# Patient Record
Sex: FEMALE | Employment: PART TIME | URBAN - METROPOLITAN AREA
[De-identification: names, ages, dates, MRNs, and addresses within clinical notes are randomized per-mention and may not be internally consistent; named-entity substitution may affect disease eponyms.]

---

## 2018-11-28 ENCOUNTER — OFFICE VISIT (OUTPATIENT)
Dept: FAMILY MEDICINE CLINIC | Age: 58
End: 2018-11-28

## 2018-11-28 VITALS
RESPIRATION RATE: 16 BRPM | SYSTOLIC BLOOD PRESSURE: 93 MMHG | WEIGHT: 167 LBS | TEMPERATURE: 98.4 F | HEIGHT: 64 IN | HEART RATE: 71 BPM | OXYGEN SATURATION: 98 % | BODY MASS INDEX: 28.51 KG/M2 | DIASTOLIC BLOOD PRESSURE: 60 MMHG

## 2018-11-28 DIAGNOSIS — E78.5 HYPERLIPIDEMIA, UNSPECIFIED HYPERLIPIDEMIA TYPE: Primary | ICD-10-CM

## 2018-11-28 DIAGNOSIS — E88.81 INSULIN RESISTANCE: ICD-10-CM

## 2018-11-28 DIAGNOSIS — G47.33 OBSTRUCTIVE SLEEP APNEA: ICD-10-CM

## 2018-11-28 RX ORDER — WITCH HAZEL 50 %
PADS, MEDICATED (EA) TOPICAL
COMMUNITY

## 2018-11-28 RX ORDER — CHOLECALCIFEROL (VITAMIN D3) 125 MCG
CAPSULE ORAL
COMMUNITY

## 2018-11-28 RX ORDER — MONTELUKAST SODIUM 10 MG/1
10 TABLET ORAL DAILY
COMMUNITY
End: 2019-12-19 | Stop reason: SDUPTHER

## 2018-11-28 RX ORDER — BISMUTH SUBSALICYLATE 262 MG
1 TABLET,CHEWABLE ORAL DAILY
COMMUNITY

## 2018-11-28 NOTE — PROGRESS NOTES
Carine Ward is a 62 y.o. female (: 1960) presenting to address: Chief Complaint Patient presents with [facility]  Cholesterol Problem Vitals:  
 18 1111 BP: 93/60 Pulse: 71 Resp: 16 Temp: 98.4 °F (36.9 °C) TempSrc: Oral  
SpO2: 98% Weight: 167 lb (75.8 kg) Height: 5' 4\" (1.626 m) PainSc:   0 - No pain Hearing/Vision: No exam data present Learning Assessment:  
No flowsheet data found. Depression Screening: PHQ over the last two weeks 2018 Little interest or pleasure in doing things Not at all Feeling down, depressed, irritable, or hopeless Not at all Total Score PHQ 2 0 Fall Risk Assessment:  
 
Fall Risk Assessment, last 12 mths 2018 Able to walk? Yes Fall in past 12 months? No  
 
Abuse Screening:  
 
Abuse Screening Questionnaire 2018 Do you ever feel afraid of your partner? Ronnie Muse Are you in a relationship with someone who physically or mentally threatens you? Ronnie Muse Is it safe for you to go home? Daiana Quiroga Coordination of Care Questionaire: 1. Have you been to the ER, urgent care clinic since your last visit? Hospitalized since your last visit? NO 
 
2. Have you seen or consulted any other health care providers outside of the [facility] since your last visit? Include any pap smears or colon screening.  NO

## 2018-11-28 NOTE — PROGRESS NOTES
HISTORY OF PRESENT ILLNESS Pamela Pool is a 62 y.o. female here to establish care. Patient has history of hyperlipidemia and insulin resistance as well as obstructive sleep apnea. She also has family history significant for father who  in his 46s secondary to myocardial infarction. Carmelo Whipple Establish Care The history is provided by the patient and medical records. Pertinent negatives include no chest pain, no abdominal pain, no headaches and no shortness of breath. Cholesterol Problem The history is provided by the patient and medical records. This is a chronic problem. The problem has not changed since onset. Pertinent negatives include no chest pain, no abdominal pain, no headaches and no shortness of breath. The symptoms are aggravated by eating. Relieved by: Lifestyle modification. Blood sugar problem The history is provided by the patient and medical records (Patient has history of insulin resistance. Last A1c was 5.9). The problem has not changed since onset. Pertinent negatives include no chest pain, no abdominal pain, no headaches and no shortness of breath. The symptoms are aggravated by eating. Relieved by: Lifestyle modification. Not on File Current Outpatient Medications Medication Sig  
 montelukast (SINGULAIR) 10 mg tablet Take 10 mg by mouth daily.  OTHER 1 Packet.  multivitamin (ONE A DAY) tablet Take 1 Tab by mouth daily.  cholecalciferol, vitamin D3, (VITAMIN D3) 2,000 unit tab Take  by mouth.  cyanocobalamin (VITAMIN B-12) 2,000 mcg TbER Take  by mouth. No current facility-administered medications for this visit. Carmelo Whipple Past Medical History:  
Diagnosis Date  Diverticulitis   
 sigmoid and descending colon with two distant episodes diverticulitis  Fatty liver  Hypercholesterolemia  Myopia of both eyes with astigmatism  Nephrolithiasis  Obesity  Osteoarthritis   
 bilateral knees  Rosacea  Seasonal allergic reaction  Sleep apnea  Vitamin D deficiency History reviewed. No pertinent surgical history. . 
Family History Problem Relation Age of Onset  Hypertension Mother [facility] Cataract Mother  Cataract Father  Elevated Lipids Father  Cancer Father Social History Socioeconomic History  Marital status: UNKNOWN Spouse name: Not on file  Number of children: Not on file  Years of education: Not on file  Highest education level: Not on file Social Needs  Financial resource strain: Not on file  Food insecurity - worry: Not on file  Food insecurity - inability: Not on file  Transportation needs - medical: Not on file  Transportation needs - non-medical: Not on file Occupational History  Not on file Tobacco Use  Smoking status: Never Smoker  Smokeless tobacco: Never Used Substance and Sexual Activity  Alcohol use: No  
  Frequency: Never  Drug use: No  
 Sexual activity: Not on file Other Topics Concern  Not on file Social History Narrative  Not on file Review of Systems Constitutional: Positive for diaphoresis. Negative for chills, fever, malaise/fatigue and weight loss. Diaphoresis secondary to menopause HENT: Negative. Negative for congestion, ear discharge, ear pain, hearing loss, nosebleeds, sore throat and tinnitus. Eyes: Negative. Negative for blurred vision, double vision, photophobia, pain, discharge and redness. Respiratory: Positive for cough. Negative for hemoptysis, sputum production, shortness of breath and wheezing. With seasonal allergies Cardiovascular: Negative. Negative for chest pain, palpitations, orthopnea, claudication, leg swelling and PND. Gastrointestinal: Negative. Negative for abdominal pain, blood in stool, constipation, diarrhea, heartburn, melena, nausea and vomiting. Genitourinary: Negative. Negative for dysuria, flank pain, frequency, hematuria and urgency. Musculoskeletal: Negative. Negative for back pain, joint pain, myalgias and neck pain. Skin: Negative. Negative for itching and rash. Neurological: Negative. Negative for dizziness, tingling, tremors, sensory change, speech change, focal weakness, seizures, loss of consciousness, weakness and headaches. Endo/Heme/Allergies: Positive for environmental allergies. Negative for polydipsia. Does not bruise/bleed easily. Psychiatric/Behavioral: Negative. Negative for depression, hallucinations, memory loss, substance abuse and suicidal ideas. The patient is not nervous/anxious and does not have insomnia. Visit Vitals BP 93/60 (BP 1 Location: Right arm, BP Patient Position: Sitting) Pulse 71 Temp 98.4 °F (36.9 °C) (Oral) Resp 16 Ht 5' 4\" (1.626 m) Wt 167 lb (75.8 kg) SpO2 98% BMI 28.67 kg/m² Physical Exam  
Constitutional: She is oriented to person, place, and time. She appears well-developed and well-nourished. HENT:  
Head: Normocephalic and atraumatic. Cardiovascular: Normal rate, regular rhythm, normal heart sounds and intact distal pulses. Exam reveals no gallop and no friction rub. No murmur heard. Pulmonary/Chest: Effort normal and breath sounds normal. No respiratory distress. She has no wheezes. She has no rales. Musculoskeletal: Normal range of motion. She exhibits no edema, tenderness or deformity. Neurological: She is alert and oriented to person, place, and time. No cranial nerve deficit. Coordination normal.  
Skin: Skin is warm and dry. No rash noted. No erythema. No pallor. Psychiatric: She has a normal mood and affect. Her behavior is normal. Judgment and thought content normal.  
Nursing note and vitals reviewed. ASSESSMENT and PLAN 
  ICD-10-CM ICD-9-CM 1. Hyperlipidemia, unspecified hyperlipidemia type E78.5 272.4 2. Insulin resistance E88.81 277.7 3. Obstructive sleep apnea G47.33 327.23 Follow-up Disposition: Return in about 2 months (around 1/28/2019). Patient will go to cardiology office to give blood for [facility] lab so that we can check inflammatory cytokines along with a lipid profile. She has been advised that if we are to start a statin we need to prophylax her with 200 mg of co-Q10 daily for 2 weeks. She also will return to the office after the holidays for 2-hour glucose tolerance test with insulin levels.

## 2019-03-10 ENCOUNTER — TELEPHONE (OUTPATIENT)
Dept: INTERNAL MEDICINE CLINIC | Age: 59
End: 2019-03-10

## 2019-03-10 DIAGNOSIS — N39.0 URINARY TRACT INFECTION WITHOUT HEMATURIA, SITE UNSPECIFIED: Primary | ICD-10-CM

## 2019-03-10 RX ORDER — SULFAMETHOXAZOLE AND TRIMETHOPRIM 800; 160 MG/1; MG/1
1 TABLET ORAL 2 TIMES DAILY
Qty: 14 TAB | Refills: 0 | Status: SHIPPED | OUTPATIENT
Start: 2019-03-10 | End: 2019-03-17

## 2019-03-10 RX ORDER — PHENAZOPYRIDINE HYDROCHLORIDE 100 MG/1
100 TABLET, FILM COATED ORAL
Qty: 9 TAB | Refills: 0 | Status: SHIPPED | OUTPATIENT
Start: 2019-03-10 | End: 2019-03-13

## 2019-03-10 NOTE — TELEPHONE ENCOUNTER
Received call from Dr. Gary Villafuerte who is on call and rounding this weekend. She has onset today of burning when voiding, urgency and frequency. No fever.   Will send bactrim and pyridium to her pharmacy

## 2019-03-26 NOTE — TELEPHONE ENCOUNTER
Received a fax from patient's pharmacy for simvastatin, but patient is allergic to statin drugs. How would you like to proceed?

## 2019-03-27 RX ORDER — SIMVASTATIN 20 MG/1
20 TABLET, FILM COATED ORAL
Qty: 90 TAB | Refills: 1 | Status: SHIPPED | OUTPATIENT
Start: 2019-03-27 | End: 2019-11-18

## 2019-11-18 ENCOUNTER — OFFICE VISIT (OUTPATIENT)
Dept: FAMILY MEDICINE CLINIC | Age: 59
End: 2019-11-18

## 2019-11-18 VITALS
BODY MASS INDEX: 36.81 KG/M2 | HEART RATE: 91 BPM | OXYGEN SATURATION: 99 % | RESPIRATION RATE: 16 BRPM | HEIGHT: 64 IN | SYSTOLIC BLOOD PRESSURE: 132 MMHG | DIASTOLIC BLOOD PRESSURE: 76 MMHG | TEMPERATURE: 97.4 F | WEIGHT: 215.6 LBS

## 2019-11-18 DIAGNOSIS — R05.9 COUGH: ICD-10-CM

## 2019-11-18 DIAGNOSIS — R60.9 EDEMA, UNSPECIFIED TYPE: ICD-10-CM

## 2019-11-18 DIAGNOSIS — R06.00 DYSPNEA, UNSPECIFIED TYPE: Primary | ICD-10-CM

## 2019-11-18 RX ORDER — BUDESONIDE AND FORMOTEROL FUMARATE DIHYDRATE 160; 4.5 UG/1; UG/1
2 AEROSOL RESPIRATORY (INHALATION) 2 TIMES DAILY
Qty: 1 INHALER | Refills: 5 | Status: SHIPPED | OUTPATIENT
Start: 2019-11-18

## 2019-11-18 NOTE — PROGRESS NOTES
Melissa Segal is a 61 y.o. female (: 1960) presenting to address:    Chief Complaint   Patient presents with    Cough       Vitals:    19 1718   BP: 132/76   Pulse: 91   Resp: 16   Temp: 97.4 °F (36.3 °C)   TempSrc: Temporal   SpO2: 99%   Weight: 215 lb 9.6 oz (97.8 kg)   Height: 5' 4\" (1.626 m)   PainSc:   0 - No pain       Hearing/Vision:   No exam data present    Learning Assessment:   No flowsheet data found. Depression Screening:     3 most recent PHQ Screens 2018   Little interest or pleasure in doing things Not at all   Feeling down, depressed, irritable, or hopeless Not at all   Total Score PHQ 2 0     Fall Risk Assessment:     Fall Risk Assessment, last 12 mths 2018   Able to walk? Yes   Fall in past 12 months? No     Abuse Screening:     Abuse Screening Questionnaire 2018   Do you ever feel afraid of your partner? N   Are you in a relationship with someone who physically or mentally threatens you? N   Is it safe for you to go home? Y     Coordination of Care Questionaire:   1. Have you been to the ER, urgent care clinic since your last visit? Hospitalized since your last visit? NO    2. Have you seen or consulted any other health care providers outside of the [address] [place] [address] since your last visit? Include any pap smears or colon screening.  NO

## 2019-11-18 NOTE — PROGRESS NOTES
HISTORY OF PRESENT ILLNESS  Maggi Askew is a 61 y.o. female for further evaluation of dyspnea cough and peripheral edema. Patient states for the past 6 months she is noticed increasing cough and dyspnea on exertion. She also has noticed increase and lower extremity edema. She denies orthopnea. .  Cough   The history is provided by the patient. This is a new problem. The problem has not changed since onset. Pertinent negatives include no chest pain, no abdominal pain, no headaches and no shortness of breath. Nothing aggravates the symptoms. Nothing relieves the symptoms. She has tried nothing for the symptoms. Breathing Problem   The history is provided by the patient. This is a new problem. The problem has not changed since onset. Associated symptoms include cough and leg swelling. Pertinent negatives include no headaches, no sputum production, no wheezing, no chest pain and no abdominal pain. It is unknown what precipitated the problem. She has tried nothing for the symptoms. She has had no prior hospitalizations. She has had no prior ED visits. She has had no prior ICU admissions. Associated medical issues do not include asthma, pneumonia or chronic lung disease. Leg Swelling   The history is provided by the patient. This is a new problem. The problem has not changed since onset. Pertinent negatives include no chest pain, no abdominal pain, no headaches and no shortness of breath. Nothing aggravates the symptoms. Nothing relieves the symptoms. She has tried nothing for the symptoms. Allergies   Allergen Reactions    Latex, Natural Rubber Unknown (comments)     Personal use only per patient    Crestor [Rosuvastatin] Myalgia     Current Outpatient Medications on File Prior to Visit   Medication Sig Dispense Refill    montelukast (SINGULAIR) 10 mg tablet Take 10 mg by mouth daily.  OTHER 1 Packet.  multivitamin (ONE A DAY) tablet Take 1 Tab by mouth daily.       cholecalciferol, vitamin D3, (VITAMIN D3) 2,000 unit tab Take  by mouth.  cyanocobalamin (VITAMIN B-12) 2,000 mcg TbER Take  by mouth.  [DISCONTINUED] simvastatin (ZOCOR) 20 mg tablet Take 1 Tab by mouth nightly. 90 Tab 1     No current facility-administered medications on file prior to visit. Past Medical History:   Diagnosis Date    Diverticulitis     sigmoid and descending colon with two distant episodes diverticulitis    Fatty liver     Hypercholesterolemia     Myopia of both eyes with astigmatism     Nephrolithiasis     Obesity     Osteoarthritis     bilateral knees    Rosacea     Seasonal allergic reaction     Sleep apnea     Vitamin D deficiency      No past surgical history on file.   Family History   Problem Relation Age of Onset    Hypertension Mother    [facility] Cataract Mother     Cataract Father    [facility] Elevated Lipids Father     Cancer Father      Social History     Socioeconomic History    Marital status: UNKNOWN     Spouse name: Not on file    Number of children: Not on file    Years of education: Not on file    Highest education level: Not on file   Occupational History    Not on file   Social Needs    Financial resource strain: Not on file    Food insecurity:     Worry: Not on file     Inability: Not on file    Transportation needs:     Medical: Not on file     Non-medical: Not on file   Tobacco Use    Smoking status: Never Smoker    Smokeless tobacco: Never Used   Substance and Sexual Activity    Alcohol use: No     Frequency: Never    Drug use: No    Sexual activity: Not on file   Lifestyle    Physical activity:     Days per week: Not on file     Minutes per session: Not on file    Stress: Not on file   Relationships    Social connections:     Talks on phone: Not on file     Gets together: Not on file     Attends [employer] service: Not on file     Active member of club or organization: Not on file     Attends meetings of clubs or organizations: Not on file     Relationship status: Not on file    Intimate partner violence:     Fear of current or ex partner: Not on file     Emotionally abused: Not on file     Physically abused: Not on file     Forced sexual activity: Not on file   Other Topics Concern    Not on file   Social History Narrative    Not on file         Review of Systems   Constitutional: Negative. Eyes: Negative. Respiratory: Positive for cough. Negative for sputum production, shortness of breath and wheezing. Cardiovascular: Positive for leg swelling. Negative for chest pain. Gastrointestinal: Negative for abdominal pain. Musculoskeletal: Negative. Neurological: Negative. Negative for headaches. Endo/Heme/Allergies: Negative. Psychiatric/Behavioral: Negative. Visit Vitals  /76 (BP 1 Location: Left arm, BP Patient Position: Sitting)   Pulse 91   Temp 97.4 °F (36.3 °C) (Temporal)   Resp 16   Ht 5' 4\" (1.626 m)   Wt 215 lb 9.6 oz (97.8 kg)   SpO2 99%   BMI 37.01 kg/m²       Physical Exam   Constitutional: She is oriented to person, place, and time. She appears well-developed and well-nourished. HENT:   Head: Normocephalic and atraumatic. Cardiovascular: Normal rate, regular rhythm, normal heart sounds and intact distal pulses. Exam reveals no gallop and no friction rub. No murmur heard. Pulmonary/Chest: Effort normal and breath sounds normal. No respiratory distress. She has no wheezes. She has no rales. Musculoskeletal: Normal range of motion. She exhibits no edema, tenderness or deformity. Neurological: She is alert and oriented to person, place, and time. No cranial nerve deficit. Coordination normal.   Skin: Skin is warm and dry. No rash noted. No erythema. No pallor. Psychiatric: She has a normal mood and affect. Her behavior is normal. Judgment and thought content normal.   Nursing note and vitals reviewed. ASSESSMENT and PLAN    ICD-10-CM ICD-9-CM    1.  Dyspnea, unspecified type R06.00 786.09 XR CHEST PA LAT      REFERRAL TO PULMONARY DISEASE   2. Cough R05 786.2 XR CHEST PA LAT      REFERRAL TO PULMONARY DISEASE   3. Edema, unspecified type R60.9 782.3 THYROID PROFILE II     Follow-up and Dispositions    · Return in about 4 weeks (around 12/16/2019).

## 2019-12-19 NOTE — TELEPHONE ENCOUNTER
Pt calling to request med refill of:  Requested Prescriptions     Pending Prescriptions Disp Refills    montelukast (SINGULAIR) 10 mg tablet       Sig: Take 1 Tab by mouth daily. Be sent to [facility]    Pt has 7 days tabs remaining     Pts last appt was 11/18/19  & no f/u appt scheduled     Pt advised of 72 hour time frame. Please advise.

## 2019-12-21 RX ORDER — MONTELUKAST SODIUM 10 MG/1
10 TABLET ORAL DAILY
Qty: 90 TAB | Refills: 2 | Status: SHIPPED | OUTPATIENT
Start: 2019-12-21

## 2020-01-03 ENCOUNTER — HOSPITAL ENCOUNTER (OUTPATIENT)
Dept: CT IMAGING | Age: 60
Discharge: HOME | End: 2020-01-03
Attending: INTERNAL MEDICINE
Payer: COMMERCIAL

## 2020-01-03 DIAGNOSIS — J84.10 PULMONARY FIBROSIS (HCC): ICD-10-CM

## 2020-01-03 PROCEDURE — 71250 CT THORAX DX C-: CPT

## 2020-01-10 ENCOUNTER — OFFICE VISIT (OUTPATIENT)
Dept: FAMILY MEDICINE CLINIC | Age: 60
End: 2020-01-10

## 2020-01-10 VITALS
RESPIRATION RATE: 16 BRPM | HEIGHT: 64 IN | OXYGEN SATURATION: 93 % | BODY MASS INDEX: 36.19 KG/M2 | DIASTOLIC BLOOD PRESSURE: 69 MMHG | WEIGHT: 212 LBS | SYSTOLIC BLOOD PRESSURE: 151 MMHG | TEMPERATURE: 98.1 F | HEART RATE: 106 BPM

## 2020-01-10 DIAGNOSIS — H60.313 ACUTE DIFFUSE OTITIS EXTERNA OF BOTH EARS: ICD-10-CM

## 2020-01-10 DIAGNOSIS — R05.9 COUGH IN ADULT: ICD-10-CM

## 2020-01-10 DIAGNOSIS — J02.9 SORE THROAT: Primary | ICD-10-CM

## 2020-01-10 PROBLEM — E66.01 SEVERE OBESITY (HCC): Status: ACTIVE | Noted: 2020-01-10

## 2020-01-10 LAB
S PYO AG THROAT QL: NEGATIVE
VALID INTERNAL CONTROL?: YES

## 2020-01-10 RX ORDER — HYDROCODONE POLISTIREX AND CHLORPHENIRAMINE POLISTIREX 10; 8 MG/5ML; MG/5ML
5 SUSPENSION, EXTENDED RELEASE ORAL
Qty: 50 ML | Refills: 0 | Status: SHIPPED | OUTPATIENT
Start: 2020-01-10 | End: 2020-01-17

## 2020-01-10 RX ORDER — NICOTINE POLACRILEX 2 MG
GUM BUCCAL
COMMUNITY

## 2020-01-10 RX ORDER — NEOMYCIN SULFATE, POLYMYXIN B SULFATE AND HYDROCORTISONE 10; 3.5; 1 MG/ML; MG/ML; [USP'U]/ML
3 SUSPENSION/ DROPS AURICULAR (OTIC) 4 TIMES DAILY
Qty: 10 ML | Refills: 0 | Status: SHIPPED | OUTPATIENT
Start: 2020-01-10 | End: 2020-01-20

## 2020-01-10 NOTE — PROGRESS NOTES
Chief Complaint   Patient presents with    Sore Throat     NO Voice x 8 day - Yellowish        1. Have you been to the ER, urgent care clinic since your last visit? Hospitalized since your last visit? NO     2. Have you seen or consulted any other health care providers outside of the [address] [place] [address] since your last visit? Include any pap smears or colon screening.  Pulmonary- Dr. Hope Maldonado / Josephine Faulkner

## 2020-01-10 NOTE — PROGRESS NOTES
[address]               [phone]    Subjective:   Jerrod Cloud is a 61 y.o. female who complains of coryza, congestion, sore throat, nasal blockage, post nasal drip, dry cough, headache, bilateral sinus pain and hoarseness for 8 days, gradually worsening since that time. She denies a history of anorexia, chest pain, chills, dizziness, fevers, myalgias, nausea and vomiting. Evaluation to date: none. Treatment to date: nasal lavage, Singulair, ventolin nasal, warm tea, cough medicine, OTC products. Patient does not smoke cigarettes. Relevant PMH: Asthma. Patient Active Problem List   Diagnosis Code    Obstructive sleep apnea G47.33    Insulin resistance E88.81    Hyperlipidemia E78.5    Edema R60.9    Cough R05    Dyspnea R06.00    Severe obesity (HCC) E66.01     Patient Active Problem List    Diagnosis Date Noted    Severe obesity ([address] 01/10/2020    Edema 11/18/2019    Cough 11/18/2019    Dyspnea 11/18/2019    Obstructive sleep apnea 11/28/2018    Insulin resistance 11/28/2018    Hyperlipidemia 11/28/2018     Current Outpatient Medications   Medication Sig Dispense Refill    biotin 1 mg cap biotin      neomycin-polymyxin-hydrocortisone, buffered, (PEDIOTIC) 3.5-10,000-1 mg/mL-unit/mL-% otic suspension Administer 3 Drops into each ear four (4) times daily for 10 days. 10 mL 0    montelukast (SINGULAIR) 10 mg tablet Take 1 Tab by mouth daily. 90 Tab 2    budesonide-formoterol (SYMBICORT) 160-4.5 mcg/actuation HFAA Take 2 Puffs by inhalation two (2) times a day. 1 Inhaler 5    OTHER 1 Packet.  multivitamin (ONE A DAY) tablet Take 1 Tab by mouth daily.  cholecalciferol, vitamin D3, (VITAMIN D3) 2,000 unit tab Take  by mouth.  cyanocobalamin (VITAMIN B-12) 2,000 mcg TbER Take  by mouth.        Allergies   Allergen Reactions    Latex, Natural Rubber Unknown (comments)     Personal use only per patient    Crestor [Rosuvastatin] Myalgia     Past Medical History:   Diagnosis Date    Diverticulitis     sigmoid and descending colon with two distant episodes diverticulitis    Fatty liver     Hypercholesterolemia     Myopia of both eyes with astigmatism     Nephrolithiasis     Obesity     Osteoarthritis     bilateral knees    Rosacea     Seasonal allergic reaction     Sleep apnea     Vitamin D deficiency      History reviewed. No pertinent surgical history. Family History   Problem Relation Age of Onset    Hypertension Mother    [place] Cataract Mother     Cataract Father    [place] Elevated Lipids Father     Cancer Father      Social History     Tobacco Use    Smoking status: Never Smoker    Smokeless tobacco: Never Used   Substance Use Topics    Alcohol use: No     Frequency: Never        Review of Systems  Pertinent items are noted in HPI. Objective:     Visit Vitals  /69   Pulse (!) 106   Temp 98.1 °F (36.7 °C)   Resp 16   Ht 5' 4\" (1.626 m)   Wt 212 lb (96.2 kg)   SpO2 93%   BMI 36.39 kg/m²     General:  alert, cooperative, no distress   Eyes: negative   Ears: abnormal external canal AD - edematous, abnormal external canal AS - edematous   Sinuses: Normal paranasal sinuses without tenderness   Mouth:  Lips, mucosa, and tongue normal. Teeth and gums normal   Neck: supple, symmetrical, trachea midline and no adenopathy. Heart: S1 and S2 normal, no murmurs noted. Lungs: clear to auscultation bilaterally   Abdomen: soft, non-tender. Bowel sounds normal. No masses,  no organomegaly        Assessment/Plan:   viral upper respiratory illness  Suggested symptomatic OTC remedies. RTC prn. Discussed diagnosis and treatment of viral URIs. Discussed the importance of avoiding unnecessary antibiotic therapy. ICD-10-CM ICD-9-CM     1. Sore throat J02.9 462 AMB POC RAPID STREP A  negative rapid strep test   2.  Cough in adult R05 786.2 chlorpheniramine-HYDROcodone (TUSSIONEX) 10-8 mg/5 mL suspension  most likely symptoms are due to a viral infection, pharyngitis most likely related to postnasal drip  We will give her cough medicine  Recommendations for symptomatic relief given   3. Acute diffuse otitis externa of both ears H60.313 380.10 neomycin-polymyxin-hydrocortisone, buffered, (PEDIOTIC) 3.5-10,000-1 mg/mL-unit/mL-% otic suspension  eardrops given for bilateral otitis externa       An After Visit Summary was printed and given to the patient. All diagnosis have been discussed with the patient and all of the patient's questions have been answered. Follow-up and Dispositions    · Return if symptoms worsen or fail to improve. Isra Calderon, [place] [id number] [address]   [address]

## 2020-01-28 ENCOUNTER — TELEPHONE (OUTPATIENT)
Dept: FAMILY MEDICINE CLINIC | Age: 60
End: 2020-01-28

## 2020-01-28 NOTE — TELEPHONE ENCOUNTER
Patient is returning a message from IM to contact the office regarding an note to discontinue her exercise routine. Call her back at [phone] or [phone]. The second is the preferred number.

## 2020-01-28 NOTE — LETTER
NOTIFICATION RETURN TO WORK / SCHOOL 
 
2/7/2020 5:44 PM 
 
Ms. Jeovany Wills [address] [id number] To Whom It May Concern: 
 
Jeovany Wills is currently under the care of Alexandra Almeida. During the month of January, 2020 MD Peter Coronado was unable to attend classes at the [facility] pool due to respiratory illnesses. If there are questions or concerns please have the patient contact our office. Sincerely, Guanako Guzman, LIANA-BC

## 2020-01-31 NOTE — TELEPHONE ENCOUNTER
Returned phone call, there was no answer, left message on voicemail to call me back for clarification as to the letter she states she needs

## 2020-02-07 NOTE — TELEPHONE ENCOUNTER
Called again, Dr. Garlon Cabot clarified she needed the letter for her gym so that her monthly dues would then be applied to February and she would not be charged for the month of January  Letter written and will be mailed to Dr. Garlon Cabot at her house per her request

## 2020-03-04 ENCOUNTER — OFFICE VISIT (OUTPATIENT)
Dept: FAMILY MEDICINE CLINIC | Age: 60
End: 2020-03-04

## 2020-03-04 VITALS
BODY MASS INDEX: 36.6 KG/M2 | OXYGEN SATURATION: 94 % | TEMPERATURE: 96.8 F | RESPIRATION RATE: 17 BRPM | HEART RATE: 86 BPM | WEIGHT: 214.4 LBS | SYSTOLIC BLOOD PRESSURE: 124 MMHG | DIASTOLIC BLOOD PRESSURE: 62 MMHG | HEIGHT: 64 IN

## 2020-03-04 DIAGNOSIS — Z13.1 SCREENING FOR DIABETES MELLITUS: ICD-10-CM

## 2020-03-04 DIAGNOSIS — Z13.220 SCREENING FOR CHOLESTEROL LEVEL: ICD-10-CM

## 2020-03-04 DIAGNOSIS — M62.838 MUSCLE SPASM: ICD-10-CM

## 2020-03-04 DIAGNOSIS — Z00.00 ROUTINE GENERAL MEDICAL EXAMINATION AT A HEALTH CARE FACILITY: Primary | ICD-10-CM

## 2020-03-04 RX ORDER — BUDESONIDE AND FORMOTEROL FUMARATE DIHYDRATE 160; 4.5 UG/1; UG/1
AEROSOL RESPIRATORY (INHALATION)
COMMUNITY
Start: 2020-02-05

## 2020-03-04 RX ORDER — BOSWELLIA SERRATA EXTRACT 100 %
POWDER (GRAM) MISCELLANEOUS
COMMUNITY

## 2020-03-04 RX ORDER — IBUPROFEN 400 MG/1
400 TABLET ORAL
COMMUNITY

## 2020-03-04 RX ORDER — CALCIUM CARBONATE 260MG(650)
TABLET,CHEWABLE ORAL
COMMUNITY

## 2020-03-04 NOTE — PROGRESS NOTES
Erma Marcelino is a 61 y.o. female (: 1960) presenting to address:    Chief Complaint   Patient presents with    Physical       Vitals:    20 1053   BP: 124/62   Pulse: 86   Resp: 17   Temp: 96.8 °F (36 °C)   TempSrc: Oral   SpO2: 94%   Weight: 214 lb 6.4 oz (97.3 kg)   Height: 5' 4\" (1.626 m)   PainSc:   0 - No pain       Hearing/Vision:   No exam data present    Learning Assessment:   No flowsheet data found. Depression Screening:     3 most recent PHQ Screens 3/4/2020   Little interest or pleasure in doing things Not at all   Feeling down, depressed, irritable, or hopeless Not at all   Total Score PHQ 2 0     Fall Risk Assessment:     Fall Risk Assessment, last 12 mths 2018   Able to walk? Yes   Fall in past 12 months? No     Abuse Screening:     Abuse Screening Questionnaire 2018   Do you ever feel afraid of your partner? N   Are you in a relationship with someone who physically or mentally threatens you? N   Is it safe for you to go home? Y     Coordination of Care Questionaire:   1. Have you been to the ER, urgent care clinic since your last visit? Hospitalized since your last visit? YES [employer] 2020 flu    2. Have you seen or consulted any other health care providers outside of the [address] [place] [address] since your last visit? Include any pap smears or colon screening. NO    Advanced Directive:   1. Do you have an Advanced Directive? YES    2. Would you like information on Advanced Directives?  NO

## 2020-03-04 NOTE — PROGRESS NOTES
HISTORY OF PRESENT ILLNESS  Lucius Essex is a 61 y.o. female Presents today for a complete physical and preventative medicine exam.  Past medical history is significant for: Bronchospasm hand cramping and hyperlipidemia  Last colonoscopy last month  Last eye examination within the last 6 months  Last pelvic exam December 2019  Last mammogram November 2019  Last dental exam February 2020  Last PSA  . Physical   The history is provided by the patient and medical records. Pertinent negatives include no chest pain, no abdominal pain, no headaches and no shortness of breath. Allergies   Allergen Reactions    Latex, Natural Rubber Unknown (comments)     Personal use only per patient    Crestor [Rosuvastatin] Myalgia     Current Outpatient Medications on File Prior to Visit   Medication Sig Dispense Refill    budesonide-formoteroL (SYMBICORT) 160-4.5 mcg/actuation HFAA       ibuprofen (MOTRIN) 400 mg tablet Take 400 mg by mouth.  magnesium citrate 100 mg tab magnesium      Boswellia nino extract (BOSWELLIA NINO XT, BULK,) 100 % powd boswellia nino extract (bulk) 100 % powder      biotin 1 mg cap biotin      montelukast (SINGULAIR) 10 mg tablet Take 1 Tab by mouth daily. 90 Tab 2    budesonide-formoterol (SYMBICORT) 160-4.5 mcg/actuation HFAA Take 2 Puffs by inhalation two (2) times a day. 1 Inhaler 5    OTHER 1 Packet.  multivitamin (ONE A DAY) tablet Take 1 Tab by mouth daily.  cholecalciferol, vitamin D3, (VITAMIN D3) 2,000 unit tab Take  by mouth.  cyanocobalamin (VITAMIN B-12) 2,000 mcg TbER Take  by mouth. No current facility-administered medications on file prior to visit.       Past Medical History:   Diagnosis Date    Diverticulitis     sigmoid and descending colon with two distant episodes diverticulitis    Fatty liver     Hypercholesterolemia     Myopia of both eyes with astigmatism     Nephrolithiasis     Obesity     Osteoarthritis     bilateral knees  Rosacea     Seasonal allergic reaction     Sleep apnea     Vitamin D deficiency      No past surgical history on file. Family History   Problem Relation Age of Onset    Hypertension Mother    Vertie Amis Cataract Mother     Cataract Father    Vertie Amis Elevated Lipids Father     Cancer Father      Social History     Socioeconomic History    Marital status: UNKNOWN     Spouse name: Not on file    Number of children: Not on file    Years of education: Not on file    Highest education level: Not on file   Occupational History    Not on file   Social Needs    Financial resource strain: Not on file    Food insecurity:     Worry: Not on file     Inability: Not on file    Transportation needs:     Medical: Not on file     Non-medical: Not on file   Tobacco Use    Smoking status: Never Smoker    Smokeless tobacco: Never Used   Substance and Sexual Activity    Alcohol use: No     Frequency: Never    Drug use: No    Sexual activity: Not on file   Lifestyle    Physical activity:     Days per week: Not on file     Minutes per session: Not on file    Stress: Not on file   Relationships    Social connections:     Talks on phone: Not on file     Gets together: Not on file     Attends Che[employer] service: Not on file     Active member of club or organization: Not on file     Attends meetings of clubs or organizations: Not on file     Relationship status: Not on file    Intimate partner violence:     Fear of current or ex partner: Not on file     Emotionally abused: Not on file     Physically abused: Not on file     Forced sexual activity: Not on file   Other Topics Concern    Not on file   Social History Narrative    Not on file       Review of Systems   Constitutional: Negative. Negative for chills, diaphoresis, fever, malaise/fatigue and weight loss. HENT: Negative for congestion, ear discharge, ear pain, hearing loss, nosebleeds, sinus pain, sore throat and tinnitus. Eyes: Negative.   Negative for blurred vision, double vision, photophobia, pain, discharge and redness. Respiratory: Positive for cough and wheezing. Negative for hemoptysis, sputum production and shortness of breath. Cardiovascular: Negative. Negative for chest pain, palpitations, orthopnea, claudication, leg swelling and PND. Gastrointestinal: Negative. Negative for abdominal pain, blood in stool, constipation, diarrhea, heartburn, melena, nausea and vomiting. Genitourinary: Negative. Negative for dysuria, flank pain, frequency, hematuria and urgency. Musculoskeletal: Positive for joint pain. Negative for back pain, myalgias and neck pain. Bilateral knee pain   Skin: Negative. Negative for itching and rash. Neurological: Negative. Negative for dizziness, tingling, tremors, sensory change, speech change, focal weakness, seizures, loss of consciousness, weakness and headaches. Endo/Heme/Allergies: Negative. Negative for environmental allergies and polydipsia. Does not bruise/bleed easily. Psychiatric/Behavioral: Negative. Negative for depression, hallucinations, memory loss, substance abuse and suicidal ideas. The patient is not nervous/anxious and does not have insomnia. Visit Vitals  /62 (BP 1 Location: Right arm, BP Patient Position: Sitting)   Pulse 86   Temp 96.8 °F (36 °C) (Oral)   Resp 17   Ht 5' 4\" (1.626 m)   Wt 214 lb 6.4 oz (97.3 kg)   SpO2 94%   BMI 36.80 kg/m²       Physical Exam  Vitals signs and nursing note reviewed. Constitutional:       Appearance: She is well-developed. HENT:      Head: Normocephalic and atraumatic. Eyes:      General: No scleral icterus. Right eye: No discharge. Left eye: No discharge. Conjunctiva/sclera: Conjunctivae normal.      Pupils: Pupils are equal, round, and reactive to light. Neck:      Musculoskeletal: Normal range of motion and neck supple. Thyroid: No thyromegaly. Vascular: No JVD. Trachea: No tracheal deviation. Cardiovascular:      Rate and Rhythm: Normal rate and regular rhythm. Heart sounds: Normal heart sounds. No murmur. No friction rub. No gallop. Pulmonary:      Effort: Pulmonary effort is normal. No respiratory distress. Breath sounds: Normal breath sounds. No wheezing or rales. Abdominal:      General: Bowel sounds are normal. There is no distension. Palpations: Abdomen is soft. There is no mass. Tenderness: There is no abdominal tenderness. There is no guarding or rebound. Musculoskeletal: Normal range of motion. General: No tenderness or deformity. Lymphadenopathy:      Cervical: No cervical adenopathy. Skin:     General: Skin is warm and dry. Coloration: Skin is not pale. Findings: No erythema or rash. Neurological:      Mental Status: She is alert and oriented to person, place, and time. Cranial Nerves: No cranial nerve deficit. Coordination: Coordination normal.   Psychiatric:         Behavior: Behavior normal.         Thought Content: Thought content normal.         Judgment: Judgment normal.         ASSESSMENT and PLAN    ICD-10-CM ICD-9-CM    1. Routine general medical examination at a health care facility Z00.00 V70.0 HEMOGLOBIN A1C WITH EAG      CBC WITH AUTOMATED DIFF      METABOLIC PANEL, COMPREHENSIVE      LIPID PANEL      LIPID PANEL      METABOLIC PANEL, COMPREHENSIVE      CBC WITH AUTOMATED DIFF      HEMOGLOBIN A1C WITH EAG   3. Screening for diabetes mellitus Z13.1 V77.1 HEMOGLOBIN A1C WITH EAG      HEMOGLOBIN A1C WITH EAG   4.  Screening for cholesterol level Z13.220 V77.91 LIPID PANEL      LIPID PANEL

## 2020-03-05 LAB
A-G RATIO,AGRAT: 1.9 RATIO (ref 1.1–2.6)
ABSOLUTE LYMPHOCYTE COUNT, 10803: 2.5 K/UL (ref 1–4.8)
ALBUMIN SERPL-MCNC: 4.5 G/DL (ref 3.5–5)
ALP SERPL-CCNC: 94 U/L (ref 25–115)
ALT SERPL-CCNC: 16 U/L (ref 5–40)
ANION GAP SERPL CALC-SCNC: 16 MMOL/L
AST SERPL W P-5'-P-CCNC: 17 U/L (ref 10–37)
AVG GLU, 10930: 131 MG/DL (ref 91–123)
BASOPHILS # BLD: 0 K/UL (ref 0–0.2)
BASOPHILS NFR BLD: 0 % (ref 0–2)
BILIRUB SERPL-MCNC: 0.7 MG/DL (ref 0.2–1.2)
BUN SERPL-MCNC: 18 MG/DL (ref 6–22)
CALCIUM SERPL-MCNC: 9.4 MG/DL (ref 8.4–10.5)
CHLORIDE SERPL-SCNC: 103 MMOL/L (ref 98–110)
CHOLEST SERPL-MCNC: 231 MG/DL (ref 110–200)
CO2 SERPL-SCNC: 27 MMOL/L (ref 20–32)
CREAT SERPL-MCNC: 0.6 MG/DL (ref 0.5–1.2)
EOSINOPHIL # BLD: 0.2 K/UL (ref 0–0.5)
EOSINOPHIL NFR BLD: 2 % (ref 0–6)
ERYTHROCYTE [DISTWIDTH] IN BLOOD BY AUTOMATED COUNT: 15.2 % (ref 10–15.5)
GFRAA, 66117: >60
GFRNA, 66118: >60
GLOBULIN,GLOB: 2.4 G/DL (ref 2–4)
GLUCOSE SERPL-MCNC: 92 MG/DL (ref 70–99)
GRANULOCYTES,GRANS: 63 % (ref 40–75)
HBA1C MFR BLD HPLC: 6.2 % (ref 4.8–5.6)
HCT VFR BLD AUTO: 44.7 % (ref 35.1–48)
HDLC SERPL-MCNC: 41 MG/DL
HDLC SERPL-MCNC: 5.6 MG/DL (ref 0–5)
HGB BLD-MCNC: 13 G/DL (ref 11.7–16)
LDL/HDL RATIO,LDHD: 3.2
LDLC SERPL CALC-MCNC: 132 MG/DL (ref 50–99)
LYMPHOCYTES, LYMLT: 28 % (ref 20–45)
MAGNESIUM SERPL-MCNC: 2 MG/DL (ref 1.6–2.5)
MCH RBC QN AUTO: 27 PG (ref 26–34)
MCHC RBC AUTO-ENTMCNC: 29 G/DL (ref 31–36)
MCV RBC AUTO: 92 FL (ref 81–99)
MONOCYTES # BLD: 0.6 K/UL (ref 0.1–1)
MONOCYTES NFR BLD: 7 % (ref 3–12)
NEUTROPHILS # BLD AUTO: 5.6 K/UL (ref 1.8–7.7)
NON-HDL CHOLESTEROL, 011976: 190 MG/DL
PLATELET # BLD AUTO: 314 K/UL (ref 140–440)
PMV BLD AUTO: 8.9 FL (ref 9–13)
POTASSIUM SERPL-SCNC: 4.7 MMOL/L (ref 3.5–5.5)
PROT SERPL-MCNC: 6.9 G/DL (ref 6.4–8.3)
RBC # BLD AUTO: 4.88 M/UL (ref 3.8–5.2)
SODIUM SERPL-SCNC: 146 MMOL/L (ref 133–145)
TRIGL SERPL-MCNC: 290 MG/DL (ref 40–149)
VLDLC SERPL CALC-MCNC: 58 MG/DL (ref 8–30)
WBC # BLD AUTO: 9 K/UL (ref 4–11)

## 2020-07-23 ENCOUNTER — VIRTUAL VISIT (OUTPATIENT)
Dept: FAMILY MEDICINE CLINIC | Age: 60
End: 2020-07-23

## 2020-07-23 NOTE — PROGRESS NOTES
1st attempt-11:00am- No answer. Left message to return call for check in prior to virtual appointment @ 11:45am    1. Have you been to the ER, urgent care clinic since your last visit? Hospitalized since your last visit? Yes for COVID exposure at work early July- negative result    2. Have you seen or consulted any other health care providers outside of the [address] [place] [address] since your last visit? Include any pap smears or colon screening. Yes 7-13- 20 Closed eye injury visit with Ophthamologist     This encounter was created in error - please disregard.

## 2022-03-18 PROBLEM — R06.00 DYSPNEA: Status: ACTIVE | Noted: 2019-11-18

## 2022-03-18 PROBLEM — E88.81 INSULIN RESISTANCE: Status: ACTIVE | Noted: 2018-11-28

## 2022-03-19 PROBLEM — R05.9 COUGH: Status: ACTIVE | Noted: 2019-11-18

## 2022-03-19 PROBLEM — E78.5 HYPERLIPIDEMIA: Status: ACTIVE | Noted: 2018-11-28

## 2022-03-19 PROBLEM — E66.01 SEVERE OBESITY (HCC): Status: ACTIVE | Noted: 2020-01-10

## 2022-03-19 PROBLEM — G47.33 OBSTRUCTIVE SLEEP APNEA: Status: ACTIVE | Noted: 2018-11-28

## 2022-03-19 PROBLEM — R60.9 EDEMA: Status: ACTIVE | Noted: 2019-11-18

## 2022-03-20 PROBLEM — M62.838 MUSCLE SPASM: Status: ACTIVE | Noted: 2020-03-04

## 2022-09-27 ENCOUNTER — HOSPITAL ENCOUNTER (OUTPATIENT)
Dept: LAB | Age: 62
Discharge: HOME | End: 2022-09-27

## 2022-09-27 LAB — SENTARA SPECIMEN COL,SENBCF: NORMAL

## 2022-09-27 PROCEDURE — 99001 SPECIMEN HANDLING PT-LAB: CPT

## 2023-01-03 ENCOUNTER — HOSPITAL ENCOUNTER (OUTPATIENT)
Dept: LAB | Age: 63
Discharge: HOME | End: 2023-01-03

## 2023-01-03 LAB — SENTARA SPECIMEN COL,SENBCF: NORMAL

## 2023-01-03 PROCEDURE — 99001 SPECIMEN HANDLING PT-LAB: CPT

## 2023-05-23 RX ORDER — CALCIUM CARBONATE 260MG(650)
TABLET,CHEWABLE ORAL
COMMUNITY

## 2023-05-23 RX ORDER — IBUPROFEN 400 MG/1
400 TABLET ORAL
COMMUNITY

## 2023-05-23 RX ORDER — MONTELUKAST SODIUM 10 MG/1
10 TABLET ORAL DAILY
COMMUNITY
Start: 2019-12-21

## 2023-05-23 RX ORDER — BUDESONIDE AND FORMOTEROL FUMARATE DIHYDRATE 160; 4.5 UG/1; UG/1
2 AEROSOL RESPIRATORY (INHALATION) 2 TIMES DAILY
COMMUNITY
Start: 2019-11-18

## 2023-05-23 RX ORDER — NICOTINE POLACRILEX 2 MG
GUM BUCCAL
COMMUNITY